# Patient Record
Sex: FEMALE | Race: ASIAN | Employment: FULL TIME | URBAN - METROPOLITAN AREA
[De-identification: names, ages, dates, MRNs, and addresses within clinical notes are randomized per-mention and may not be internally consistent; named-entity substitution may affect disease eponyms.]

---

## 2019-10-21 ENCOUNTER — HOSPITAL ENCOUNTER (EMERGENCY)
Age: 59
Discharge: HOME | End: 2019-10-22
Attending: EMERGENCY MEDICINE
Payer: COMMERCIAL

## 2019-10-21 DIAGNOSIS — R00.2 PALPITATIONS: Primary | ICD-10-CM

## 2019-10-21 DIAGNOSIS — R00.0 SINUS TACHYCARDIA: ICD-10-CM

## 2019-10-21 DIAGNOSIS — K04.7 DENTAL INFECTION: ICD-10-CM

## 2019-10-21 LAB
ALBUMIN SERPL-MCNC: 3.5 G/DL (ref 3.5–5)
ALBUMIN/GLOB SERPL: 0.8 {RATIO} (ref 1.1–2.2)
ALP SERPL-CCNC: 89 U/L (ref 45–117)
ALT SERPL-CCNC: 29 U/L (ref 12–78)
ANION GAP SERPL CALC-SCNC: 13 MMOL/L (ref 5–15)
AST SERPL-CCNC: 15 U/L (ref 15–37)
BASOPHILS # BLD: 0 K/UL (ref 0–0.1)
BASOPHILS NFR BLD: 0 % (ref 0–1)
BILIRUB SERPL-MCNC: 0.3 MG/DL (ref 0.2–1)
BUN SERPL-MCNC: 13 MG/DL (ref 6–20)
BUN/CREAT SERPL: 16 (ref 12–20)
CALCIUM SERPL-MCNC: 8.9 MG/DL (ref 8.5–10.1)
CHLORIDE SERPL-SCNC: 101 MMOL/L (ref 97–108)
CO2 SERPL-SCNC: 27 MMOL/L (ref 21–32)
CREAT SERPL-MCNC: 0.83 MG/DL (ref 0.55–1.02)
D DIMER PPP FEU-MCNC: 0.24 MG/L FEU (ref 0–0.65)
DIFFERENTIAL METHOD BLD: ABNORMAL
EOSINOPHIL # BLD: 0.1 K/UL (ref 0–0.4)
EOSINOPHIL NFR BLD: 1 % (ref 0–7)
ERYTHROCYTE [DISTWIDTH] IN BLOOD BY AUTOMATED COUNT: 11.9 % (ref 11.5–14.5)
GLOBULIN SER CALC-MCNC: 4.5 G/DL (ref 2–4)
GLUCOSE SERPL-MCNC: 157 MG/DL (ref 65–100)
HCT VFR BLD AUTO: 42.6 % (ref 35–47)
HGB BLD-MCNC: 14.4 G/DL (ref 11.5–16)
IMM GRANULOCYTES # BLD AUTO: 0.2 K/UL (ref 0–0.04)
IMM GRANULOCYTES NFR BLD AUTO: 2 % (ref 0–0.5)
LYMPHOCYTES # BLD: 3.2 K/UL (ref 0.8–3.5)
LYMPHOCYTES NFR BLD: 29 % (ref 12–49)
MCH RBC QN AUTO: 29 PG (ref 26–34)
MCHC RBC AUTO-ENTMCNC: 33.8 G/DL (ref 30–36.5)
MCV RBC AUTO: 85.9 FL (ref 80–99)
MONOCYTES # BLD: 0.8 K/UL (ref 0–1)
MONOCYTES NFR BLD: 8 % (ref 5–13)
NEUTS SEG # BLD: 6.6 K/UL (ref 1.8–8)
NEUTS SEG NFR BLD: 60 % (ref 32–75)
NRBC # BLD: 0 K/UL (ref 0–0.01)
NRBC BLD-RTO: 0 PER 100 WBC
PLATELET # BLD AUTO: 397 K/UL (ref 150–400)
PMV BLD AUTO: 8.7 FL (ref 8.9–12.9)
POTASSIUM SERPL-SCNC: 3.6 MMOL/L (ref 3.5–5.1)
PROT SERPL-MCNC: 8 G/DL (ref 6.4–8.2)
RBC # BLD AUTO: 4.96 M/UL (ref 3.8–5.2)
SODIUM SERPL-SCNC: 141 MMOL/L (ref 136–145)
TROPONIN I BLD-MCNC: <0.04 NG/ML (ref 0–0.08)
WBC # BLD AUTO: 10.9 K/UL (ref 3.6–11)

## 2019-10-21 PROCEDURE — 36415 COLL VENOUS BLD VENIPUNCTURE: CPT

## 2019-10-21 PROCEDURE — 74011250636 HC RX REV CODE- 250/636: Performed by: EMERGENCY MEDICINE

## 2019-10-21 PROCEDURE — 99285 EMERGENCY DEPT VISIT HI MDM: CPT

## 2019-10-21 PROCEDURE — 84484 ASSAY OF TROPONIN QUANT: CPT

## 2019-10-21 PROCEDURE — 80053 COMPREHEN METABOLIC PANEL: CPT

## 2019-10-21 PROCEDURE — 85025 COMPLETE CBC W/AUTO DIFF WBC: CPT

## 2019-10-21 PROCEDURE — 85379 FIBRIN DEGRADATION QUANT: CPT

## 2019-10-21 PROCEDURE — 93005 ELECTROCARDIOGRAM TRACING: CPT

## 2019-10-21 PROCEDURE — 74011250637 HC RX REV CODE- 250/637: Performed by: EMERGENCY MEDICINE

## 2019-10-21 RX ORDER — PENICILLIN V POTASSIUM 250 MG/1
500 TABLET, FILM COATED ORAL
Status: COMPLETED | OUTPATIENT
Start: 2019-10-22 | End: 2019-10-22

## 2019-10-21 RX ORDER — PENICILLIN V POTASSIUM 500 MG/1
500 TABLET, FILM COATED ORAL 4 TIMES DAILY
Qty: 28 TAB | Refills: 0 | Status: SHIPPED | OUTPATIENT
Start: 2019-10-21 | End: 2019-10-28

## 2019-10-21 RX ORDER — ACETAMINOPHEN 500 MG
1000 TABLET ORAL
Status: COMPLETED | OUTPATIENT
Start: 2019-10-22 | End: 2019-10-21

## 2019-10-21 RX ADMIN — SODIUM CHLORIDE 1000 ML: 900 INJECTION, SOLUTION INTRAVENOUS at 23:04

## 2019-10-21 RX ADMIN — ACETAMINOPHEN 1000 MG: 500 TABLET ORAL at 23:16

## 2019-10-22 ENCOUNTER — APPOINTMENT (OUTPATIENT)
Dept: GENERAL RADIOLOGY | Age: 59
End: 2019-10-22
Attending: EMERGENCY MEDICINE
Payer: COMMERCIAL

## 2019-10-22 VITALS
HEART RATE: 102 BPM | BODY MASS INDEX: 28.89 KG/M2 | OXYGEN SATURATION: 98 % | WEIGHT: 143.3 LBS | DIASTOLIC BLOOD PRESSURE: 86 MMHG | HEIGHT: 59 IN | TEMPERATURE: 98.8 F | SYSTOLIC BLOOD PRESSURE: 133 MMHG | RESPIRATION RATE: 16 BRPM

## 2019-10-22 LAB
ATRIAL RATE: 115 BPM
CALCULATED P AXIS, ECG09: 40 DEGREES
CALCULATED R AXIS, ECG10: -9 DEGREES
CALCULATED T AXIS, ECG11: 38 DEGREES
COMMENT, HOLDF: NORMAL
DIAGNOSIS, 93000: NORMAL
P-R INTERVAL, ECG05: 164 MS
Q-T INTERVAL, ECG07: 330 MS
QRS DURATION, ECG06: 86 MS
QTC CALCULATION (BEZET), ECG08: 456 MS
SAMPLES BEING HELD,HOLD: NORMAL
VENTRICULAR RATE, ECG03: 115 BPM

## 2019-10-22 PROCEDURE — 71046 X-RAY EXAM CHEST 2 VIEWS: CPT

## 2019-10-22 PROCEDURE — 74011250637 HC RX REV CODE- 250/637: Performed by: EMERGENCY MEDICINE

## 2019-10-22 RX ADMIN — PENICILLIN V POTASIUM 500 MG: 250 TABLET ORAL at 00:12

## 2019-10-22 NOTE — ED NOTES
I have reviewed discharge instructions with the patient. The patient verbalized understanding. Ambulatory to vehicle with spouse . Nad. No c/o. Denies heart palpations.

## 2019-10-22 NOTE — ED PROVIDER NOTES
Temo Smith is a 65 yo F with history of diabetes who presents to the ED with elevated heart rate. She states that yesterday she noticed that she had pain in her lower right teeth and gums and figured she had an infection in that area. She brushed and swished salt water and the discomfort improved. This morning she felt her heart rate elevated but it went away and she felt it again this evening so she put on her husbands apple watch which said her heart rate was 120. She continued to monitor it and it remained in the 120s [facility]. [facility] her temp [facility] 99.4. She denies chest pain, shortness of breath, nausea or fever.            Past Medical History:   Diagnosis Date    Diabetes ([address]     H/O mammogram 2014    HTN (hypertension)     Papanicolaou smear for cervical cancer screening 2014    S/P colonoscopy 10/2015       Past Surgical History:   Procedure Laterality Date    HX  SECTION  ,     HX CHOLECYSTECTOMY           Family History:   Problem Relation Age of Onset    Cancer Mother         beast     Diabetes Mother     High Cholesterol Mother     Heart Disease Father     High Cholesterol Father     Diabetes Sister     High Cholesterol Sister     Cancer Brother 48        lung    Diabetes Brother     High Cholesterol Brother     Diabetes Brother     High Cholesterol Brother     Diabetes Brother     High Cholesterol Brother     Diabetes Brother     High Cholesterol Brother     Diabetes Brother     High Cholesterol Brother     Diabetes Daughter     High Cholesterol Daughter        Social History     Socioeconomic History    Marital status:      Spouse name: Not on file    Number of children: Not on file    Years of education: Not on file    Highest education level: Not on file   Occupational History    Not on file   Social Needs    Financial resource strain: Not on file   Roque-Casimiro insecurity:     Worry: Not on file     Inability: Not on file    Transportation needs:     Medical: Not on file     Non-medical: Not on file   Tobacco Use    Smoking status: Never Smoker    Smokeless tobacco: Never Used   Substance and Sexual Activity    Alcohol use: Yes     Alcohol/week: 0.0 standard drinks     Comment: occational    Drug use: No    Sexual activity: Not on file   Lifestyle    Physical activity:     Days per week: Not on file     Minutes per session: Not on file    Stress: Not on file   Relationships    Social connections:     Talks on phone: Not on file     Gets together: Not on file     Attends Islam service: Not on file     Active member of club or organization: Not on file     Attends meetings of clubs or organizations: Not on file     Relationship status: Not on file    Intimate partner violence:     Fear of current or ex partner: Not on file     Emotionally abused: Not on file     Physically abused: Not on file     Forced sexual activity: Not on file   Other Topics Concern    Not on file   Social History Narrative    Not on file         ALLERGIES: Patient has no known allergies. Review of Systems   Constitutional: Negative for fever. HENT: Positive for dental problem (right lower dental/gum pain). Negative for sore throat. Eyes: Negative for visual disturbance. Respiratory: Negative for cough and shortness of breath. Cardiovascular: Positive for palpitations. Negative for chest pain. Gastrointestinal: Negative for abdominal pain and nausea. Genitourinary: Negative for dysuria. Musculoskeletal: Negative for back pain. Skin: Negative for rash. Neurological: Negative for headaches. Vitals:    10/21/19 2249   BP: (!) 149/98   Pulse: (!) 117   Resp: 18   Temp: 99.2 °F (37.3 °C)   SpO2: 97%   Weight: 65 kg (143 lb 4.8 oz)   Height: 4' 11\" (1.499 m)            Physical Exam   Constitutional: She appears well-developed and well-nourished. No distress. HENT:   Head: Normocephalic and atraumatic. Mouth/Throat: Oropharynx is clear and moist. Abnormal dentition (inflammed receding gums on right lower molars). Eyes: Conjunctivae and EOM are normal.   Neck: Normal range of motion and phonation normal.   Cardiovascular: Normal rate, regular rhythm and intact distal pulses. No murmur heard. Pulmonary/Chest: Effort normal. No respiratory distress. Abdominal: She exhibits no distension. Musculoskeletal: Normal range of motion. She exhibits no tenderness. Neurological: She is alert. She is not disoriented. She exhibits normal muscle tone. Skin: Skin is warm and dry. Nursing note and vitals reviewed. ED EKG interpretation:  Rhythm: sinus tachycardia; and regular . Rate (approx.): 115; Axis: normal; P wave: normal; QRS interval: normal ; ST/T wave: normal; Other findings: borderline ekg. This EKG was interpreted by Edyta Castillo MD,ED Provider. MDM   sinus tachycardia. Suspect due to low grade fever due to oral infection. Trop, CMP normal.  Will treat with pen VK. Patient advised to follow-up with dentist.  Elaine Altamirano also to follow-up with cardiology for evaluation.      Procedures

## 2019-10-22 NOTE — ED TRIAGE NOTES
Pt reports that she was sent from [facility] for a heart rate in the 120s. Pt reports \"earlier today her apple watch said her heart rate was fast and she felt like her heart was beating fast so she went to [facility] to have it checked out. Pt denies SOB or CP.  Pt reports \"this happened once before years ago but she never saw a Cardiologist.\"

## 2019-10-23 ENCOUNTER — OFFICE VISIT (OUTPATIENT)
Dept: CARDIOLOGY CLINIC | Age: 59
End: 2019-10-23

## 2019-10-23 VITALS
HEIGHT: 59 IN | HEART RATE: 120 BPM | OXYGEN SATURATION: 97 % | SYSTOLIC BLOOD PRESSURE: 120 MMHG | BODY MASS INDEX: 28.63 KG/M2 | DIASTOLIC BLOOD PRESSURE: 80 MMHG | WEIGHT: 142 LBS

## 2019-10-23 DIAGNOSIS — E11.9 CONTROLLED TYPE 2 DIABETES MELLITUS WITHOUT COMPLICATION, WITHOUT LONG-TERM CURRENT USE OF INSULIN (HCC): ICD-10-CM

## 2019-10-23 DIAGNOSIS — R94.31 ABNORMAL EKG: ICD-10-CM

## 2019-10-23 DIAGNOSIS — R00.0 TACHYCARDIA: Primary | ICD-10-CM

## 2019-10-23 RX ORDER — METOPROLOL SUCCINATE 25 MG/1
25 TABLET, EXTENDED RELEASE ORAL DAILY
Qty: 30 TAB | Refills: 11 | Status: SHIPPED | OUTPATIENT
Start: 2019-10-23 | End: 2019-10-23 | Stop reason: SDUPTHER

## 2019-10-23 RX ORDER — METFORMIN HYDROCHLORIDE 500 MG/1
500 TABLET ORAL
COMMUNITY

## 2019-10-23 RX ORDER — METOPROLOL SUCCINATE 25 MG/1
25 TABLET, EXTENDED RELEASE ORAL DAILY
Qty: 30 TAB | Refills: 5 | Status: SHIPPED | OUTPATIENT
Start: 2019-10-23 | End: 2020-03-04 | Stop reason: SDUPTHER

## 2019-11-06 ENCOUNTER — OFFICE VISIT (OUTPATIENT)
Dept: CARDIOLOGY CLINIC | Age: 59
End: 2019-11-06

## 2019-11-06 VITALS
DIASTOLIC BLOOD PRESSURE: 80 MMHG | SYSTOLIC BLOOD PRESSURE: 128 MMHG | WEIGHT: 142 LBS | HEART RATE: 86 BPM | OXYGEN SATURATION: 98 % | HEIGHT: 59 IN | BODY MASS INDEX: 28.63 KG/M2

## 2019-11-06 DIAGNOSIS — R00.0 INAPPROPRIATE SINUS TACHYCARDIA: Primary | ICD-10-CM

## 2019-11-06 DIAGNOSIS — E11.9 CONTROLLED TYPE 2 DIABETES MELLITUS WITHOUT COMPLICATION, WITHOUT LONG-TERM CURRENT USE OF INSULIN (HCC): ICD-10-CM

## 2019-11-06 DIAGNOSIS — R00.0 TACHYCARDIA: ICD-10-CM

## 2019-11-06 DIAGNOSIS — R94.31 ABNORMAL EKG: ICD-10-CM

## 2019-11-06 NOTE — PROGRESS NOTES
HISTORY OF PRESENT ILLNESS  Yaneth Garcia is a 64 y.o. female. She is seen in follow up for sinus tachycardia. I placed her on Toprol XL 25 mg a day and her heart rate is now in the 75-80 range. She has no side effects from this. An echocardiogram done in the office today is unremarkable. She is feeling better. Her heart rate on her Apple watch is no longer over 100. She is followed by an endocrinologist and her thyroid is checked every three months. She has diabetes. She has gingivitis but this is improved and she is no longer on antibiotics. When she went to the [place] [facility] her globulin level was elevated but I suspect this is due to her infection. However, this should be checked again at some point in the future. HPI  . pro  Current Outpatient Medications   Medication Sig Dispense Refill    metFORMIN (GLUCOPHAGE) 500 mg tablet Take 500 mg by mouth two (2) times daily (with meals). 2 tabs bid      metoprolol succinate (TOPROL-XL) 25 mg XL tablet Take 1 Tab by mouth daily. 30 Tab 5    fish oil-omega-3 fatty acids 300-500 mg cap Take  by mouth.  cyanocobalamin 1,000 mcg tablet Take 1,000 mcg by mouth daily.  glipiZIDE SR (GLUCOTROL) 5 mg CR tablet Take 2 Tabs by mouth daily. 60 Tab 2    Aspirin, Buffered 81 mg tab Take 1 Tab by mouth daily.  cholecalciferol, VITAMIN D3, (VITAMIN D3) 5,000 unit tab tablet Take  by mouth daily. Past Medical History:   Diagnosis Date    Diabetes ([address]     H/O mammogram 2014    HTN (hypertension)     Papanicolaou smear for cervical cancer screening 2014    S/P colonoscopy 10/2015     Past Surgical History:   Procedure Laterality Date    HX  SECTION  , 18    [id number]       Review of Systems   Cardiovascular: Positive for palpitations. All other systems reviewed and are negative.     Visit Vitals  /80   Pulse 86   Ht 4' 11\" (1.499 m)   Wt 142 lb (64.4 kg)   SpO2 98%   BMI 28.68 kg/m²       Physical Exam Constitutional: She is oriented to person, place, and time. She appears well-nourished. HENT:   Head: Atraumatic. Eyes: Conjunctivae are normal.   Neck: Neck supple. Cardiovascular: Normal rate, regular rhythm and normal heart sounds. Exam reveals no gallop and no friction rub. No murmur heard. Pulmonary/Chest: Breath sounds normal. She has no wheezes. She has no rales. Abdominal: Bowel sounds are normal.   Musculoskeletal: She exhibits no edema. Neurological: She is oriented to person, place, and time. No cranial nerve deficit. Skin: Skin is dry. Psychiatric: Her behavior is normal.   Nursing note reviewed. ASSESSMENT and PLAN  Her heart rate is down on a very low dose of beta blocker. She does seem to have inappropriate sinus tachycardia of uncertain etiology. Most likely possibilities are some dysautonomia and association for diabetes or chronic low grade thumb infection. She certainly has no evidence for endocarditis. At this point I will just continue her low dose Toprol XL and see her back in four months' time.

## 2020-03-04 ENCOUNTER — OFFICE VISIT (OUTPATIENT)
Dept: CARDIOLOGY CLINIC | Age: 60
End: 2020-03-04

## 2020-03-04 VITALS
RESPIRATION RATE: 16 BRPM | HEART RATE: 88 BPM | DIASTOLIC BLOOD PRESSURE: 84 MMHG | OXYGEN SATURATION: 98 % | SYSTOLIC BLOOD PRESSURE: 120 MMHG | BODY MASS INDEX: 28.63 KG/M2 | WEIGHT: 142 LBS | HEIGHT: 59 IN

## 2020-03-04 DIAGNOSIS — E11.9 CONTROLLED TYPE 2 DIABETES MELLITUS WITHOUT COMPLICATION, WITHOUT LONG-TERM CURRENT USE OF INSULIN (HCC): ICD-10-CM

## 2020-03-04 DIAGNOSIS — R94.31 ABNORMAL EKG: ICD-10-CM

## 2020-03-04 DIAGNOSIS — R00.0 TACHYCARDIA: Primary | ICD-10-CM

## 2020-03-04 RX ORDER — BACLOFEN 20 MG
1 TABLET ORAL DAILY
COMMUNITY

## 2020-03-04 RX ORDER — METOPROLOL SUCCINATE 25 MG/1
25 TABLET, EXTENDED RELEASE ORAL DAILY
Qty: 30 TAB | Refills: 11 | Status: SHIPPED | OUTPATIENT
Start: 2020-03-04 | End: 2021-05-03 | Stop reason: ALTCHOICE

## 2020-03-04 NOTE — PROGRESS NOTES
HISTORY OF PRESENT ILLNESS  Yaneth Lockhart is a 64 y.o. female. She has a history of diabetes for more than 15 years and was originally seen by me for inappropriate resting sinus tachycardia. She was tried on Toprol XL 25 mg a day. On her own however, she stopped it because she was worried about the potential to cause depression. She has been off the pill for at least a month and sometimes her heart rate will be in the 60-70 range at rest. It is about 90 bpm in the office today. HPI  Patient Active Problem List   Diagnosis Code    Diabetes ([facility] E11.9    HTN (hypertension) I10     Current Outpatient Medications   Medication Sig Dispense Refill    magnesium oxide 500 mg tab Take 1 Tab by mouth daily.  metoprolol succinate (TOPROL-XL) 25 mg XL tablet Take 1 Tab by mouth daily. 30 Tab 11    metFORMIN (GLUCOPHAGE) 500 mg tablet Take 500 mg by mouth two (2) times daily (with meals). 2 tabs bid      glipiZIDE SR (GLUCOTROL) 5 mg CR tablet Take 2 Tabs by mouth daily. 60 Tab 2    Aspirin, Buffered 81 mg tab Take 1 Tab by mouth daily.  cholecalciferol, VITAMIN D3, (VITAMIN D3) 5,000 unit tab tablet Take  by mouth daily.  fish oil-omega-3 fatty acids 300-500 mg cap Take  by mouth.  cyanocobalamin 1,000 mcg tablet Take 1,000 mcg by mouth daily. Past Medical History:   Diagnosis Date    Diabetes ([facility]     H/O mammogram 2014    HTN (hypertension)     Papanicolaou smear for cervical cancer screening 2014    S/P colonoscopy 10/2015     Past Surgical History:   Procedure Laterality Date    HX  SECTION  , 18    [address]       Review of Systems   Cardiovascular: Positive for palpitations. All other systems reviewed and are negative.     Visit Vitals  /84 (BP 1 Location: Left arm, BP Patient Position: Sitting)   Pulse 88   Resp 16   Ht 4' 11\" (1.499 m)   Wt 142 lb (64.4 kg)   SpO2 98%   BMI 28.68 kg/m²       Physical Exam  Vitals signs and nursing note reviewed. Constitutional:       Appearance: Normal appearance. HENT:      Head: Normocephalic. Right Ear: External ear normal.      Nose: Nose normal.      Mouth/Throat:      Mouth: Mucous membranes are moist.   Eyes:      Extraocular Movements: Extraocular movements intact. Neck:      Musculoskeletal: Normal range of motion. Cardiovascular:      Rate and Rhythm: Normal rate and regular rhythm. Heart sounds: No murmur. No friction rub. No gallop. Pulmonary:      Effort: No respiratory distress. Breath sounds: No wheezing. Abdominal:      Palpations: Abdomen is soft. There is no mass. Musculoskeletal:         General: No swelling. Skin:     Coloration: Skin is not jaundiced. Neurological:      General: No focal deficit present. Mental Status: She is alert. Psychiatric:         Behavior: Behavior normal.         ASSESSMENT and PLAN  It seems like her heart rate is always in the normal range off the medication. It does go up with exercise which I told her was normal. She is concerned that her heart could become weak with the rapid heart rate. I will refill the medication for her and give her a script for it but she will stay off it for now. I will see her back in six months.

## 2020-09-09 ENCOUNTER — OFFICE VISIT (OUTPATIENT)
Dept: CARDIOLOGY CLINIC | Age: 60
End: 2020-09-09
Payer: COMMERCIAL

## 2020-09-09 VITALS
HEART RATE: 85 BPM | SYSTOLIC BLOOD PRESSURE: 110 MMHG | HEIGHT: 59 IN | BODY MASS INDEX: 29.43 KG/M2 | RESPIRATION RATE: 16 BRPM | OXYGEN SATURATION: 97 % | WEIGHT: 146 LBS | DIASTOLIC BLOOD PRESSURE: 80 MMHG

## 2020-09-09 DIAGNOSIS — R00.0 TACHYCARDIA: ICD-10-CM

## 2020-09-09 DIAGNOSIS — R00.0 INAPPROPRIATE SINUS TACHYCARDIA: Primary | ICD-10-CM

## 2020-09-09 DIAGNOSIS — R94.31 ABNORMAL EKG: ICD-10-CM

## 2020-09-09 DIAGNOSIS — E11.9 CONTROLLED TYPE 2 DIABETES MELLITUS WITHOUT COMPLICATION, WITHOUT LONG-TERM CURRENT USE OF INSULIN (HCC): ICD-10-CM

## 2020-09-09 PROCEDURE — 99214 OFFICE O/P EST MOD 30 MIN: CPT | Performed by: SPECIALIST

## 2020-09-09 RX ORDER — FLASH GLUCOSE SENSOR
KIT MISCELLANEOUS
COMMUNITY
Start: 2020-08-25

## 2020-09-09 RX ORDER — ERTUGLIFLOZIN AND METFORMIN HYDROCHLORIDE 7.5; 1 MG/1; MG/1
500 TABLET, FILM COATED ORAL 2 TIMES DAILY
COMMUNITY
Start: 2020-08-25

## 2020-09-09 NOTE — PROGRESS NOTES
HISTORY OF PRESENT ILLNESS  Yaneth Matos is a 64 y.o. female. She has been seen in the past with inappropriate sinus tachycardia. I tried her on a low-dose of a beta-blocker but she stopped it and her heart rate runs around 85 bpm.  Cholecystectomy but sometimes when she eats meat or pizza she will have a bloated feeling or tightness in her chest.  Her hemoglobin A1c is in the range of 6.7 and she has gained 4 pounds. HPI  Patient Active Problem List   Diagnosis Code    Diabetes ([facility] E11.9    HTN (hypertension) I10     Current Outpatient Medications   Medication Sig Dispense Refill    Segluromet 7.5-1,000 mg tab Take 1,000 mg by mouth two (2) times a day.  glipiZIDE SR (GLUCOTROL) 5 mg CR tablet Take 2 Tabs by mouth daily. 60 Tab 2    Aspirin, Buffered 81 mg tab Take 1 Tab by mouth daily.  cholecalciferol, VITAMIN D3, (VITAMIN D3) 5,000 unit tab tablet Take  by mouth daily.  FreeStyle [place] 14 Day Sensor kit USE AS DIRECTED TO CHECK BLOOD SUGARS      magnesium oxide 500 mg tab Take 1 Tab by mouth daily.  metoprolol succinate (TOPROL-XL) 25 mg XL tablet Take 1 Tab by mouth daily. 30 Tab 11    metFORMIN (GLUCOPHAGE) 500 mg tablet Take 500 mg by mouth two (2) times daily (with meals). 2 tabs bid      fish oil-omega-3 fatty acids 300-500 mg cap Take  by mouth.  cyanocobalamin 1,000 mcg tablet Take 1,000 mcg by mouth daily. Past Medical History:   Diagnosis Date    Diabetes ([facility]     H/O mammogram 11/2014    HTN (hypertension)     Papanicolaou smear for cervical cancer screening 2/2014    S/P colonoscopy 10/2015     Past Surgical History:   Procedure Laterality Date    HX [address], 18    HX CHOLECYSTECTOMY  1995       Review of Systems   Gastrointestinal: Positive for heartburn. All other systems reviewed and are negative.     Visit Vitals  /80 (BP 1 Location: Left arm, BP Patient Position: Sitting)   Pulse 85   Resp 16   Ht 4' 11\" (1.499 m)   Wt 146 lb (66.2 kg)   SpO2 97%   BMI 29.49 kg/m²       Physical Exam   Constitutional: She is oriented to person, place, and time. She appears well-nourished. HENT:   Head: Atraumatic. Eyes: Conjunctivae are normal.   Neck: Neck supple. Cardiovascular: Normal rate, regular rhythm and normal heart sounds. Exam reveals no gallop and no friction rub. No murmur heard. Pulmonary/Chest: Breath sounds normal. She has no wheezes. Abdominal: Bowel sounds are normal.   Musculoskeletal:         General: No edema. Neurological: She is oriented to person, place, and time. Skin: Skin is dry. Psychiatric: Her behavior is normal.   Nursing note and vitals reviewed. ASSESSMENT and PLAN  It seems that her heart rate is now in the normal range despite not taking any specific medication. I suggested that she try to walk more and be careful about her weight. I will plan to see her back in 6 months time with her .

## 2021-05-03 ENCOUNTER — OFFICE VISIT (OUTPATIENT)
Dept: CARDIOLOGY CLINIC | Age: 61
End: 2021-05-03
Payer: COMMERCIAL

## 2021-05-03 VITALS
RESPIRATION RATE: 18 BRPM | BODY MASS INDEX: 28.83 KG/M2 | SYSTOLIC BLOOD PRESSURE: 130 MMHG | HEART RATE: 97 BPM | OXYGEN SATURATION: 81 % | WEIGHT: 143 LBS | DIASTOLIC BLOOD PRESSURE: 80 MMHG | HEIGHT: 59 IN

## 2021-05-03 DIAGNOSIS — E11.9 CONTROLLED TYPE 2 DIABETES MELLITUS WITHOUT COMPLICATION, WITHOUT LONG-TERM CURRENT USE OF INSULIN (HCC): ICD-10-CM

## 2021-05-03 DIAGNOSIS — R00.0 INAPPROPRIATE SINUS TACHYCARDIA: Primary | ICD-10-CM

## 2021-05-03 DIAGNOSIS — R00.0 TACHYCARDIA: ICD-10-CM

## 2021-05-03 DIAGNOSIS — R94.31 ABNORMAL EKG: ICD-10-CM

## 2021-05-03 DIAGNOSIS — E78.2 MIXED HYPERLIPIDEMIA: ICD-10-CM

## 2021-05-03 PROCEDURE — 99214 OFFICE O/P EST MOD 30 MIN: CPT | Performed by: SPECIALIST

## 2021-05-03 RX ORDER — GLUCOSAMINE/CHONDR SU A SOD 750-600 MG
TABLET ORAL
COMMUNITY

## 2021-05-03 RX ORDER — OMEGA-3-ACID ETHYL ESTERS 1 G/1
2 CAPSULE, LIQUID FILLED ORAL 2 TIMES DAILY WITH MEALS
Qty: 360 CAP | Refills: 3 | Status: SHIPPED | OUTPATIENT
Start: 2021-05-03 | End: 2022-05-13 | Stop reason: SDUPTHER

## 2021-05-03 RX ORDER — ROSUVASTATIN CALCIUM 5 MG/1
TABLET, COATED ORAL
COMMUNITY
Start: 2021-01-31 | End: 2021-11-01 | Stop reason: ALTCHOICE

## 2021-06-30 NOTE — PROGRESS NOTES
HISTORY OF PRESENT ILLNESS  Yaneth Powell is a 64 y.o. female. She is referred for evaluation of sinus tachycardia. She has had diabetes for about 15 years and also has a fairly long history of a multinodular goiter. Both of these conditions are followed by an endocrinologist and she has thyroid testing done every three to four months, the last being normal in August of this year. She had a fast heart rate about ten years ago that resolved. She was in [place] at the time. She has gone to a [facility] practitioner who has done acupuncture and given her herbal medications although she has not taken any herbal medications for about a month. She recently went to [facility] where she was seen and evaluated with normal labs and released. She has been wearing her 's Apple watch for several days and her heart rate has been going up and is often in the 120-130 range. She can be lightheaded in the morning when she first gets up. She does have palpitations but no chest pain or shortness of breath. She walks 10,000 to 15,000 steps every day at work. She has had some gingivitis in her mouth with some bleeding gums and was started on penicillin at the urgent care/emergency room. She had a low-grade fever several days ago but nothing since. HPI  Patient Active Problem List   Diagnosis Code    [facility] ([facility] E11.9    HTN (hypertension) I10     Current Outpatient Medications   Medication Sig Dispense Refill    metFORMIN (GLUCOPHAGE) 500 mg tablet Take 500 mg by mouth two (2) times daily (with meals). 2 tabs bid      metoprolol succinate (TOPROL-XL) 25 mg XL tablet Take 1 Tab by mouth daily. 30 Tab 11    penicillin v potassium (VEETID) 500 mg tablet Take 1 Tab by mouth four (4) times daily for 7 days. 28 Tab 0    fish oil-omega-3 fatty acids 300-500 mg cap Take  by mouth.  glipiZIDE SR (GLUCOTROL) 5 mg CR tablet Take 2 Tabs by mouth daily.  60 Tab 2    Aspirin, Buffered 81 mg tab Problem: Pressure Injury - Risk of  Goal: *Prevention of pressure injury  Description: Document Nicko Scale and appropriate interventions in the flowsheet. Outcome: Progressing Towards Goal  Note: Pressure Injury Interventions:  Sensory Interventions: Assess changes in LOC    Moisture Interventions: Absorbent underpads    Activity Interventions: Increase time out of bed, Pressure redistribution bed/mattress(bed type)    Mobility Interventions: Pressure redistribution bed/mattress (bed type)    Nutrition Interventions: Document food/fluid/supplement intake    Friction and Shear Interventions: Minimize layers                Problem: Patient Education: Go to Patient Education Activity  Goal: Patient/Family Education  Outcome: Progressing Towards Goal     Problem: Falls - Risk of  Goal: *Absence of Falls  Description: Document [place] Fall Risk and appropriate interventions in the flowsheet.   Outcome: Progressing Towards Goal  Note: Fall Risk Interventions:  Mobility Interventions: Bed/chair exit alarm, Patient to call before getting OOB    Mentation Interventions: Adequate sleep, hydration, pain control    Medication Interventions: Bed/chair exit alarm, Patient to call before getting OOB    Elimination Interventions: Bed/chair exit alarm, Call light in reach, Patient to call for help with toileting needs    History of Falls Interventions: Bed/chair exit alarm         Problem: Patient Education: Go to Patient Education Activity  Goal: Patient/Family Education  Outcome: Progressing Towards Goal Take 1 Tab by mouth daily.  cholecalciferol, VITAMIN D3, (VITAMIN D3) 5,000 unit tab tablet Take  by mouth daily.  lisinopril (PRINIVIL, ZESTRIL) 5 mg tablet Take 1 Tab by mouth daily. 30 Tab 5    cyanocobalamin 1,000 mcg tablet Take 1,000 mcg by mouth daily.  traMADol (ULTRAM) 50 mg tablet Take 1 Tab by mouth every eight (8) hours as needed for Pain. Max Daily Amount: 150 mg. 20 Tab 1     Past Medical History:   Diagnosis Date    Diabetes ([address]     H/O mammogram 2014    HTN (hypertension)     Papanicolaou smear for cervical cancer screening 2014    S/P colonoscopy 10/2015     Past Surgical History:   Procedure Laterality Date    HX  SECTION  , 18    [facility]       Review of Systems   Cardiovascular: Positive for palpitations. Neurological: Positive for dizziness. All other systems reviewed and are negative. Visit Vitals  /80 (BP 1 Location: Left arm, BP Patient Position: Sitting)   Pulse (!) 120   Ht 4' 11\" (1.499 m)   Wt 142 lb (64.4 kg)   SpO2 97%   BMI 28.68 kg/m²       Physical Exam   Constitutional: She is oriented to person, place, and time. She appears well-nourished. HENT:   Head: Atraumatic. Eyes: Conjunctivae are normal.   Neck: Neck supple. Cardiovascular: Regular rhythm and normal heart sounds. Tachycardia present. Exam reveals no gallop and no friction rub. No murmur heard. Pulmonary/Chest: Breath sounds normal. She has no wheezes. She has no rales. Abdominal: Bowel sounds are normal.   Musculoskeletal: She exhibits no edema. Neurological: She is oriented to person, place, and time. No cranial nerve deficit. Skin: Skin is dry. Psychiatric: Her behavior is normal.   Nursing note and vitals reviewed. ASSESSMENT and PLAN  Her recent EKG was reviewed and does show sinus tachycardia at 115 bpm. Her heart rate is about 120 bpm today. According to her, it never goes over 130 with the Apple watch.  I do not think this represents paroxysmal supraventricular tachycardia but she does have sinus tachycardia for uncertain reasons. She has no signs or symptoms of coronary disease and does not smoke or drink alcohol to excess. She walks a lot without difficulty. Certainly, a cardiomyopathy needs to be excluded although her exam is unremarkable. There is also some mild concern regarding her infection in her gums and low grade fever as well as the possibility of endocarditis. Her blood pressure was higher in the emergency room but is not high today. Nevertheless, I will try her empirically on Toprol XL 25 mg a day. I will have her return for an echocardiogram and will see her in the office afterwards.

## 2021-11-01 ENCOUNTER — OFFICE VISIT (OUTPATIENT)
Dept: CARDIOLOGY CLINIC | Age: 61
End: 2021-11-01
Payer: COMMERCIAL

## 2021-11-01 VITALS
SYSTOLIC BLOOD PRESSURE: 136 MMHG | OXYGEN SATURATION: 97 % | WEIGHT: 140 LBS | HEART RATE: 90 BPM | BODY MASS INDEX: 28.22 KG/M2 | HEIGHT: 59 IN | DIASTOLIC BLOOD PRESSURE: 90 MMHG | RESPIRATION RATE: 16 BRPM

## 2021-11-01 DIAGNOSIS — R00.0 INAPPROPRIATE SINUS TACHYCARDIA: ICD-10-CM

## 2021-11-01 DIAGNOSIS — E11.9 CONTROLLED TYPE 2 DIABETES MELLITUS WITHOUT COMPLICATION, WITHOUT LONG-TERM CURRENT USE OF INSULIN (HCC): ICD-10-CM

## 2021-11-01 DIAGNOSIS — I42.9 CARDIOMYOPATHY, UNSPECIFIED TYPE (HCC): Primary | ICD-10-CM

## 2021-11-01 DIAGNOSIS — I10 PRIMARY HYPERTENSION: ICD-10-CM

## 2021-11-01 DIAGNOSIS — E78.2 MIXED HYPERLIPIDEMIA: ICD-10-CM

## 2021-11-01 DIAGNOSIS — R00.0 TACHYCARDIA: ICD-10-CM

## 2021-11-01 PROCEDURE — 99214 OFFICE O/P EST MOD 30 MIN: CPT | Performed by: SPECIALIST

## 2021-11-01 RX ORDER — ASPIRIN 81 MG/1
TABLET ORAL
COMMUNITY

## 2021-11-01 NOTE — PROGRESS NOTES
HISTORY OF PRESENT ILLNESS  Yaneth Segovia is a 63 y.o. female. She has diabetes and a history of inappropriate sinus tachycardia. She also has hyper cholesterolemia and was on Crestor with reduction in her cholesterol but it caused her blood sugar to go up and she stopped it. Her hemoglobin A1c is recently increased to 6.7. She does have a strong family history of coronary disease. She takes omega-3 fatty acids and her triglycerides apparently were down to 119 whereas it had been elevated. Her LDL cholesterol is currently 133 on no therapy. She walks quite a bit without chest pain. HPI  Patient Active Problem List   Diagnosis Code    [facility] E[facility]9    HTN (hypertension) I10     Current Outpatient Medications   Medication Sig Dispense Refill    zinc sulfate (ZINC-15 PO) Take  by mouth. OTC      omega-3 acid ethyl esters (Lovaza) 1 gram capsule Take 2 Caps by mouth two (2) times daily (with meals). 360 Cap 3    Segluromet 7.5-1,000 mg tab Take 1,000 mg by mouth two (2) times a day.  FreeStyle [place] 14 Day Sensor kit USE AS DIRECTED TO CHECK BLOOD SUGARS      magnesium oxide 500 mg tab Take 1 Tab by mouth daily.  metFORMIN (GLUCOPHAGE) 500 mg tablet Take 500 mg by mouth two (2) times daily (with meals). 2 tabs bid      fish oil-omega-3 fatty acids 300-500 mg cap Take  by mouth.  cyanocobalamin 1,000 mcg tablet Take 1,000 mcg by mouth daily.  glipiZIDE SR (GLUCOTROL) 5 mg CR tablet Take 2 Tabs by mouth daily. 60 Tab 2    Aspirin, Buffered 81 mg tab Take 1 Tab by mouth daily.  aspirin delayed-release 81 mg tablet 1 tablet (Patient not taking: Reported on 11/1/2021)      Biotin 2,500 mcg cap Take  by mouth. Patient don't know the dosage (Patient not taking: Reported on 11/1/2021)      cholecalciferol, VITAMIN D3, (VITAMIN D3) 5,000 unit tab tablet Take  by mouth daily.        Past Medical History:   Diagnosis Date    [facility]     H/O mammogram 11/2014    HTN (hypertension)     Papanicolaou smear for cervical cancer screening 2/2014    S/P colonoscopy 10/2015     Past Surgical History:   Procedure Laterality Date    HX [address] 18    HX CHOLECYSTECTOMY  1995       Review of Systems   Constitutional: Positive for weight loss. All other systems reviewed and are negative. Visit Vitals  BP (!) 136/90 (BP 1 Location: Left upper arm, BP Patient Position: Sitting, BP Cuff Size: Adult)   Pulse 90   Resp 16   Ht 4' 11\" (1.499 m)   Wt 140 lb (63.5 kg)   SpO2 97%   BMI 28.28 kg/m²       Physical Exam  Vitals and nursing note reviewed. HENT:      Head: Normocephalic. Right Ear: External ear normal.      Left Ear: External ear normal.      Nose: Nose normal.      Mouth/Throat:      Mouth: Mucous membranes are moist.   Eyes:      Extraocular Movements: Extraocular movements intact. Cardiovascular:      Rate and Rhythm: Normal rate and regular rhythm. Heart sounds: Normal heart sounds. Pulmonary:      Breath sounds: Normal breath sounds. Abdominal:      Palpations: Abdomen is soft. Musculoskeletal:         General: Normal range of motion. Cervical back: Normal range of motion. Skin:     General: Skin is warm. Neurological:      General: No focal deficit present. Mental Status: She is alert. Psychiatric:         Behavior: Behavior normal.         ASSESSMENT and PLAN  She has lost 3 pounds and is exercising quite a bit. I think that she should be considered for a different approach to lowering her cholesterol because of her elevated numbers and family history and diabetes. She could be tried on Nexlizet. However first I will order a coronary calcium score to see if she warrants treatment. We will call her regarding the results and about starting different medications. Otherwise she will come back in 6 months.

## 2021-11-08 ENCOUNTER — TRANSCRIBE ORDER (OUTPATIENT)
Dept: SCHEDULING | Age: 61
End: 2021-11-08

## 2021-11-08 DIAGNOSIS — Z13.6 SCREENING FOR CARDIOVASCULAR CONDITION: Primary | ICD-10-CM

## 2021-11-15 ENCOUNTER — HOSPITAL ENCOUNTER (OUTPATIENT)
Dept: CT IMAGING | Age: 61
Discharge: HOME | End: 2021-11-15
Attending: SPECIALIST

## 2021-11-15 DIAGNOSIS — Z13.6 SCREENING FOR CARDIOVASCULAR CONDITION: ICD-10-CM

## 2021-11-15 PROCEDURE — 75571 CT HRT W/O DYE W/CA TEST: CPT

## 2021-11-16 ENCOUNTER — TELEPHONE (OUTPATIENT)
Dept: CARDIOLOGY CLINIC | Age: 61
End: 2021-11-16

## 2021-11-16 RX ORDER — BEMPEDOIC ACID AND EZETIMIBE 180; 10 MG/1; MG/1
TABLET, FILM COATED ORAL
Status: CANCELLED | OUTPATIENT
Start: 2021-11-16

## 2021-11-16 NOTE — TELEPHONE ENCOUNTER
----- Message from Mayur Gamble MD sent at 11/16/2021 12:21 PM EST -----  Calcium score elevated can restart Crestor (concerns about sugar) or start Nexlizet  ----- Message -----  From: Angelo Hernandez Results In  Sent: 11/15/2021   8:54 AM EST  To: Mayur Gamble MD          Sent pt mychart message and it was reviewed on 11/18/2021

## 2022-05-13 ENCOUNTER — OFFICE VISIT (OUTPATIENT)
Dept: CARDIOLOGY CLINIC | Age: 62
End: 2022-05-13
Payer: COMMERCIAL

## 2022-05-13 VITALS
HEIGHT: 59 IN | DIASTOLIC BLOOD PRESSURE: 80 MMHG | WEIGHT: 141 LBS | HEART RATE: 97 BPM | BODY MASS INDEX: 28.43 KG/M2 | SYSTOLIC BLOOD PRESSURE: 120 MMHG | OXYGEN SATURATION: 97 %

## 2022-05-13 DIAGNOSIS — I10 PRIMARY HYPERTENSION: ICD-10-CM

## 2022-05-13 DIAGNOSIS — R00.0 INAPPROPRIATE SINUS TACHYCARDIA: Primary | ICD-10-CM

## 2022-05-13 DIAGNOSIS — R94.31 ABNORMAL EKG: ICD-10-CM

## 2022-05-13 DIAGNOSIS — E11.9 CONTROLLED TYPE 2 DIABETES MELLITUS WITHOUT COMPLICATION, WITHOUT LONG-TERM CURRENT USE OF INSULIN (HCC): ICD-10-CM

## 2022-05-13 DIAGNOSIS — I25.84 CORONARY ARTERY CALCIFICATION: ICD-10-CM

## 2022-05-13 DIAGNOSIS — I25.10 CORONARY ARTERY CALCIFICATION: ICD-10-CM

## 2022-05-13 PROCEDURE — 99214 OFFICE O/P EST MOD 30 MIN: CPT | Performed by: SPECIALIST

## 2022-05-13 RX ORDER — OMEGA-3-ACID ETHYL ESTERS 1 G/1
2 CAPSULE, LIQUID FILLED ORAL 2 TIMES DAILY WITH MEALS
Qty: 360 CAPSULE | Refills: 3 | Status: SHIPPED | OUTPATIENT
Start: 2022-05-13

## 2022-05-13 NOTE — PROGRESS NOTES
HISTORY OF PRESENT ILLNESS  Ryder Estes is a 63 y.o. female. She has diabetes and occasional resting tachycardia. Recent coronary calcium score was abnormal.  Her calcium score is 191. She has been taking Crestor 5 mg daily from her endocrinologist and apparently her numbers are good although I do not have them. Her heart rate at rest is often in the 90s at home and she is concerned about this. She has an occasional pulling sensation in her chest while sitting at a computer but not with exercise. Her echo was normal done several years ago. HPI  Patient Active Problem List   Diagnosis Code    Diabetes ([address] E11.9    HTN (hypertension) I10     Current Outpatient Medications   Medication Sig Dispense Refill    omega-3 acid ethyl esters (Lovaza) 1 gram capsule Take 2 Capsules by mouth two (2) times daily (with meals). 360 Capsule 3    OTHER Crestor? Advised to restart crestor or take nexlizet per Dr. Kayode Liu aspirin delayed-release 81 mg tablet 1 tablet      zinc sulfate (ZINC-15 PO) Take  by mouth. OTC      Segluromet 7.5-1,000 mg tab Take 500 mg by mouth two (2) times a day.  FreeStyle Bolivar 14 Day Sensor kit USE AS DIRECTED TO CHECK BLOOD SUGARS      metFORMIN (GLUCOPHAGE) 500 mg tablet Take 500 mg by mouth once over twenty-four (24) hours. 2 ONCE DAILY WITH LUNCH      fish oil-omega-3 fatty acids 300-500 mg cap Take  by mouth.  cyanocobalamin 1,000 mcg tablet Take 1,000 mcg by mouth daily.  glipiZIDE SR (GLUCOTROL) 5 mg CR tablet Take 2 Tabs by mouth daily. 60 Tab 2    cholecalciferol, VITAMIN D3, (VITAMIN D3) 5,000 unit tab tablet Take  by mouth daily.  Biotin 2,500 mcg cap Take  by mouth. Patient don't know the dosage (Patient not taking: Reported on 11/1/2021)      magnesium oxide 500 mg tab Take 1 Tab by mouth daily. (Patient not taking: Reported on 5/13/2022)      Aspirin, Buffered 81 mg tab Take 1 Tab by mouth daily.  (Patient not taking: Reported on 5/13/2022) Past Medical History:   Diagnosis Date    Diabetes ([address]     H/O mammogram 11/2014    HTN (hypertension)     Papanicolaou smear for cervical cancer screening 2/2014    S/P colonoscopy 10/2015     Past Surgical History:   Procedure Laterality Date    [address]X [address]       Review of Systems   Cardiovascular: Positive for chest pain and palpitations. All other systems reviewed and are negative. Visit Vitals  /80 (BP 1 Location: Left upper arm, BP Patient Position: Sitting, BP Cuff Size: Adult)   Pulse 97   Ht 4' 11\" (1.499 m)   Wt 141 lb (64 kg)   SpO2 97%   BMI 28.48 kg/m²       Physical Exam  Vitals and nursing note reviewed. Constitutional:       Appearance: Normal appearance. HENT:      Head: Normocephalic. Right Ear: External ear normal.      Left Ear: External ear normal.      Nose: Nose normal.      Mouth/Throat:      Mouth: Mucous membranes are moist.   Eyes:      Extraocular Movements: Extraocular movements intact. Cardiovascular:      Rate and Rhythm: Normal rate and regular rhythm. Heart sounds: Normal heart sounds. Pulmonary:      Breath sounds: Normal breath sounds. Abdominal:      Palpations: Abdomen is soft. Musculoskeletal:         General: Normal range of motion. Cervical back: Normal range of motion. Skin:     General: Skin is warm. Neurological:      Mental Status: She is alert and oriented to person, place, and time. Psychiatric:         Behavior: Behavior normal.         ASSESSMENT and PLAN  Her heart rate is 90 bpm to my exam.  Her blood pressure is not elevated. Not sure that a beta-blocker would be tolerated. I reassured her that this is normal heart rate. She will try to bring her cholesterol numbers when I next see her and I will have her return in 6 months.

## 2022-05-13 NOTE — PROGRESS NOTES
Larissa Modi is a 63 y.o. female    Visit Vitals  /80 (BP 1 Location: Left upper arm, BP Patient Position: Sitting, BP Cuff Size: Adult)   Pulse 97   Ht 4' 11\" (1.499 m)   Wt 141 lb (64 kg)   SpO2 97%   BMI 28.48 kg/m²       Chief Complaint   Patient presents with    Follow-up       Chest pain PALPITATION  SOB OCCASTIONAL  Dizziness NO  Swelling NO  Recent hospital visit NO  Refills NO  COVID VACCINE STATUS YES  HAD COVID?  NO

## 2023-03-13 ENCOUNTER — TELEPHONE (OUTPATIENT)
Dept: CARDIOLOGY CLINIC | Age: 63
End: 2023-03-13

## 2023-03-13 NOTE — TELEPHONE ENCOUNTER
Called pt. Verified patient's identity with two identifiers. She thought about getting repeat ca score. I explained probably not necessary, usually not. She is scheduled to see Dr. Angus Rios in April and will bring recent blood work results to that appt. Advised she wait to discuss with Dr. Angus Rios as to whether to repeat calcium score or do anything else. Patient verbalized understanding and denied further questions or concerns.

## 2023-03-13 NOTE — TELEPHONE ENCOUNTER
Will pt like to know if an order is needed for calcium scoring test? Pt would like to have this test done again.       Pt# [phone]

## 2023-04-17 ENCOUNTER — OFFICE VISIT (OUTPATIENT)
Dept: CARDIOLOGY CLINIC | Age: 63
End: 2023-04-17
Payer: COMMERCIAL

## 2023-04-17 VITALS
OXYGEN SATURATION: 97 % | DIASTOLIC BLOOD PRESSURE: 80 MMHG | SYSTOLIC BLOOD PRESSURE: 120 MMHG | BODY MASS INDEX: 28.02 KG/M2 | WEIGHT: 139 LBS | RESPIRATION RATE: 16 BRPM | HEART RATE: 100 BPM | HEIGHT: 59 IN

## 2023-04-17 DIAGNOSIS — I25.10 CORONARY ARTERY CALCIFICATION: ICD-10-CM

## 2023-04-17 DIAGNOSIS — R00.0 INAPPROPRIATE SINUS TACHYCARDIA: Primary | ICD-10-CM

## 2023-04-17 DIAGNOSIS — R94.31 ABNORMAL EKG: ICD-10-CM

## 2023-04-17 DIAGNOSIS — I25.84 CORONARY ARTERY CALCIFICATION: ICD-10-CM

## 2023-04-17 DIAGNOSIS — I10 PRIMARY HYPERTENSION: ICD-10-CM

## 2023-04-17 DIAGNOSIS — E11.9 CONTROLLED TYPE 2 DIABETES MELLITUS WITHOUT COMPLICATION, WITHOUT LONG-TERM CURRENT USE OF INSULIN (HCC): ICD-10-CM

## 2023-04-17 PROCEDURE — 3074F SYST BP LT 130 MM HG: CPT | Performed by: SPECIALIST

## 2023-04-17 PROCEDURE — 3079F DIAST BP 80-89 MM HG: CPT | Performed by: SPECIALIST

## 2023-04-17 PROCEDURE — 99214 OFFICE O/P EST MOD 30 MIN: CPT | Performed by: SPECIALIST

## 2023-04-17 RX ORDER — ROSUVASTATIN CALCIUM 5 MG/1
5 TABLET, COATED ORAL DAILY
COMMUNITY
Start: 2023-01-09

## 2023-04-17 RX ORDER — ALPHA LIPOIC ACID 300 MG
CAPSULE ORAL
COMMUNITY

## 2023-04-17 RX ORDER — CALCIUM CARBONATE/VITAMIN D3 600 MG-10
TABLET ORAL
COMMUNITY

## 2023-04-17 RX ORDER — BLOOD-GLUCOSE SENSOR
EACH MISCELLANEOUS SEE ADMIN INSTRUCTIONS
COMMUNITY
Start: 2023-03-04

## 2023-05-21 RX ORDER — BACLOFEN 20 MG
1 TABLET ORAL DAILY
COMMUNITY

## 2023-05-21 RX ORDER — VIT C/HESPERIDIN/BIOFLAVONOIDS 500-100 MG
TABLET ORAL
COMMUNITY

## 2023-05-21 RX ORDER — GLIPIZIDE 5 MG/1
10 TABLET, FILM COATED, EXTENDED RELEASE ORAL DAILY
COMMUNITY
Start: 2016-01-31

## 2023-05-21 RX ORDER — ERTUGLIFLOZIN AND METFORMIN HYDROCHLORIDE 7.5; 1 MG/1; MG/1
500 TABLET, FILM COATED ORAL 2 TIMES DAILY
COMMUNITY
Start: 2020-08-25

## 2023-05-21 RX ORDER — ALPHA LIPOIC ACID 300 MG
CAPSULE ORAL
COMMUNITY

## 2023-05-21 RX ORDER — CHLORAL HYDRATE 500 MG
2000 CAPSULE ORAL 2 TIMES DAILY WITH MEALS
COMMUNITY
Start: 2022-05-13

## 2023-05-21 RX ORDER — ROSUVASTATIN CALCIUM 5 MG/1
5 TABLET, COATED ORAL DAILY
COMMUNITY
Start: 2023-01-09

## 2023-05-21 RX ORDER — ASPIRIN 81 MG/1
TABLET ORAL
COMMUNITY